# Patient Record
Sex: MALE | Race: WHITE | HISPANIC OR LATINO | Employment: UNEMPLOYED | URBAN - METROPOLITAN AREA
[De-identification: names, ages, dates, MRNs, and addresses within clinical notes are randomized per-mention and may not be internally consistent; named-entity substitution may affect disease eponyms.]

---

## 2022-09-06 ENCOUNTER — HOSPITAL ENCOUNTER (EMERGENCY)
Facility: HOSPITAL | Age: 37
Discharge: HOME | End: 2022-09-06
Attending: EMERGENCY MEDICINE

## 2022-09-06 VITALS
DIASTOLIC BLOOD PRESSURE: 81 MMHG | RESPIRATION RATE: 16 BRPM | BODY MASS INDEX: 29.16 KG/M2 | HEIGHT: 65 IN | TEMPERATURE: 99 F | WEIGHT: 175 LBS | HEART RATE: 83 BPM | OXYGEN SATURATION: 98 % | SYSTOLIC BLOOD PRESSURE: 113 MMHG

## 2022-09-06 DIAGNOSIS — R00.2 PALPITATIONS: Primary | ICD-10-CM

## 2022-09-06 DIAGNOSIS — R42 VERTIGO: ICD-10-CM

## 2022-09-06 DIAGNOSIS — R07.9 CHEST PAIN: ICD-10-CM

## 2022-09-06 LAB
ALBUMIN SERPL BCP-MCNC: 3.8 G/DL (ref 3.5–5.2)
ALP SERPL-CCNC: 69 U/L (ref 55–135)
ALT SERPL W/O P-5'-P-CCNC: 81 U/L (ref 10–44)
ANION GAP SERPL CALC-SCNC: 9 MMOL/L (ref 8–16)
AST SERPL-CCNC: 37 U/L (ref 10–40)
BASOPHILS # BLD AUTO: 0.06 K/UL (ref 0–0.2)
BASOPHILS NFR BLD: 0.7 % (ref 0–1.9)
BILIRUB SERPL-MCNC: 0.5 MG/DL (ref 0.1–1)
BNP SERPL-MCNC: 17 PG/ML (ref 0–99)
BUN SERPL-MCNC: 18 MG/DL (ref 6–20)
CALCIUM SERPL-MCNC: 9 MG/DL (ref 8.7–10.5)
CHLORIDE SERPL-SCNC: 108 MMOL/L (ref 95–110)
CO2 SERPL-SCNC: 23 MMOL/L (ref 23–29)
CREAT SERPL-MCNC: 0.8 MG/DL (ref 0.5–1.4)
D DIMER PPP IA.FEU-MCNC: <0.19 MG/L FEU
DIFFERENTIAL METHOD: ABNORMAL
EOSINOPHIL # BLD AUTO: 0.2 K/UL (ref 0–0.5)
EOSINOPHIL NFR BLD: 2.1 % (ref 0–8)
ERYTHROCYTE [DISTWIDTH] IN BLOOD BY AUTOMATED COUNT: 12.3 % (ref 11.5–14.5)
EST. GFR  (NO RACE VARIABLE): >60 ML/MIN/1.73 M^2
GLUCOSE SERPL-MCNC: 107 MG/DL (ref 70–110)
HCT VFR BLD AUTO: 40.8 % (ref 40–54)
HGB BLD-MCNC: 13.9 G/DL (ref 14–18)
IMM GRANULOCYTES # BLD AUTO: 0.02 K/UL (ref 0–0.04)
IMM GRANULOCYTES NFR BLD AUTO: 0.2 % (ref 0–0.5)
LYMPHOCYTES # BLD AUTO: 1.6 K/UL (ref 1–4.8)
LYMPHOCYTES NFR BLD: 18 % (ref 18–48)
MCH RBC QN AUTO: 29 PG (ref 27–31)
MCHC RBC AUTO-ENTMCNC: 34.1 G/DL (ref 32–36)
MCV RBC AUTO: 85 FL (ref 82–98)
MONOCYTES # BLD AUTO: 0.4 K/UL (ref 0.3–1)
MONOCYTES NFR BLD: 4.8 % (ref 4–15)
NEUTROPHILS # BLD AUTO: 6.6 K/UL (ref 1.8–7.7)
NEUTROPHILS NFR BLD: 74.2 % (ref 38–73)
NRBC BLD-RTO: 0 /100 WBC
PLATELET # BLD AUTO: 244 K/UL (ref 150–450)
PMV BLD AUTO: 9.8 FL (ref 9.2–12.9)
POTASSIUM SERPL-SCNC: 3.7 MMOL/L (ref 3.5–5.1)
PROT SERPL-MCNC: 6.7 G/DL (ref 6–8.4)
RBC # BLD AUTO: 4.79 M/UL (ref 4.6–6.2)
SODIUM SERPL-SCNC: 140 MMOL/L (ref 136–145)
TROPONIN I SERPL DL<=0.01 NG/ML-MCNC: <0.006 NG/ML (ref 0–0.03)
TROPONIN I SERPL DL<=0.01 NG/ML-MCNC: <0.006 NG/ML (ref 0–0.03)
WBC # BLD AUTO: 8.87 K/UL (ref 3.9–12.7)

## 2022-09-06 PROCEDURE — 93010 EKG 12-LEAD: ICD-10-PCS | Mod: ,,, | Performed by: INTERNAL MEDICINE

## 2022-09-06 PROCEDURE — 93005 ELECTROCARDIOGRAM TRACING: CPT

## 2022-09-06 PROCEDURE — 83880 ASSAY OF NATRIURETIC PEPTIDE: CPT | Performed by: EMERGENCY MEDICINE

## 2022-09-06 PROCEDURE — 85025 COMPLETE CBC W/AUTO DIFF WBC: CPT | Performed by: EMERGENCY MEDICINE

## 2022-09-06 PROCEDURE — 85379 FIBRIN DEGRADATION QUANT: CPT | Performed by: EMERGENCY MEDICINE

## 2022-09-06 PROCEDURE — 99285 EMERGENCY DEPT VISIT HI MDM: CPT | Mod: 25

## 2022-09-06 PROCEDURE — 96360 HYDRATION IV INFUSION INIT: CPT

## 2022-09-06 PROCEDURE — 80053 COMPREHEN METABOLIC PANEL: CPT | Performed by: EMERGENCY MEDICINE

## 2022-09-06 PROCEDURE — 25000003 PHARM REV CODE 250: Performed by: EMERGENCY MEDICINE

## 2022-09-06 PROCEDURE — 84484 ASSAY OF TROPONIN QUANT: CPT | Performed by: EMERGENCY MEDICINE

## 2022-09-06 PROCEDURE — 93010 ELECTROCARDIOGRAM REPORT: CPT | Mod: ,,, | Performed by: INTERNAL MEDICINE

## 2022-09-06 RX ORDER — MECLIZINE HYDROCHLORIDE 25 MG/1
50 TABLET ORAL
Status: COMPLETED | OUTPATIENT
Start: 2022-09-06 | End: 2022-09-06

## 2022-09-06 RX ORDER — MECLIZINE HYDROCHLORIDE 25 MG/1
25 TABLET ORAL 3 TIMES DAILY PRN
Qty: 20 TABLET | Refills: 0 | Status: SHIPPED | OUTPATIENT
Start: 2022-09-06

## 2022-09-06 RX ADMIN — MECLIZINE HYDROCHLORIDE 50 MG: 25 TABLET ORAL at 08:09

## 2022-09-06 RX ADMIN — SODIUM CHLORIDE 1000 ML: 0.9 INJECTION, SOLUTION INTRAVENOUS at 08:09

## 2022-09-06 NOTE — ED TRIAGE NOTES
Pt BIB EMS with C/O palpations and dizziness since 0600. Pt states dizziness denies CP, N/V/D, fever, or H/A. No aggravating or alleviating factors for palpations.   Pt is AAOx4, NAD, VS as charted, breathing even and unlabored. No PMHx.

## 2022-09-06 NOTE — ED PROVIDER NOTES
Encounter Date: 2022    SCRIBE #1 NOTE: I, Estherfelipe Valdez, am scribing for, and in the presence of,  Gera Kelly MD. I have scribed the following portions of the note - Other sections scribed: HPI, ROS, PE, EKG, MDM.     History     Chief Complaint   Patient presents with    Palpitations     EMs called to 36yo male that woke up this morning about 6am feeling like his heart was racing and having mild SOB.        This 37 y.o male, with no medical history, presents to the ED via EMS transportation c/o acute, constant palpitations and dizziness that awoke him from sleep at 6:00 am this morning. Pt reports experiencing a room spinning sensation, but also notes feeling as though he wasn't to pass out. The dizziness is presently persisting. He also notes currently feeling short of breath. Of note, pt repots that he consumed a large amount of beer last night. He notes that he typically consumes alcohol 1x a week and only smokes tobacco at that time as well. No drug use. No recent falls or head trauma. Additionally, pt notes that his father  of cardiac issues in his 60's. He denies headache, sinus issues, abdominal pain, chest pain, or any other associated symptoms. No alleviating factors.    The history is provided by the patient. The history is limited by a language barrier. A  was used ( ID#: [id number]).   Review of patient's allergies indicates:  No Known Allergies  No past medical history on file.  No past surgical history on file.  No family history on file.     Review of Systems   Constitutional:  Negative for fever.   HENT:  Negative for sinus pain and sore throat.    Eyes:  Negative for visual disturbance.   Respiratory:  Positive for shortness of breath.    Cardiovascular:  Positive for palpitations. Negative for chest pain.   Gastrointestinal:  Negative for abdominal pain and nausea.   Genitourinary:  Negative for dysuria.   Musculoskeletal:  Negative for back pain.    Skin:  Negative for rash.   Neurological:  Positive for dizziness. Negative for weakness and headaches.     Physical Exam     Initial Vitals [09/06/22 0711]   BP Pulse Resp Temp SpO2   138/83 (!) 115 18 98.3 °F (36.8 °C) 97 %      MAP       --         Physical Exam    Nursing note and vitals reviewed.  Constitutional: He appears well-developed and well-nourished. He is not diaphoretic. No distress.   HENT:   Head: Normocephalic and atraumatic.   Mouth/Throat: Oropharynx is clear and moist.   Eyes: EOM are normal. Pupils are equal, round, and reactive to light.   Horizontal nystagmus in both directions.   Neck: Neck supple.   Cardiovascular:  Normal rate and regular rhythm.           Pulmonary/Chest: Breath sounds normal. No respiratory distress.   Abdominal: Abdomen is soft. Bowel sounds are normal.   Musculoskeletal:         General: No edema.      Cervical back: Neck supple.     Neurological: He is alert and oriented to person, place, and time.   Positive Barrett's [place] to the right.   Skin: Skin is warm and dry.   Psychiatric: He has a normal mood and affect.       ED Course   Procedures  Labs Reviewed   CBC W/ AUTO DIFFERENTIAL - Abnormal; Notable for the following components:       Result Value    Hemoglobin 13.9 (*)     Gran % 74.2 (*)     All other components within normal limits   COMPREHENSIVE METABOLIC PANEL - Abnormal; Notable for the following components:    ALT 81 (*)     All other components within normal limits   TROPONIN I   B-TYPE NATRIURETIC PEPTIDE   D DIMER, QUANTITATIVE   TROPONIN I     EKG Readings: (Independently Interpreted)   Rhythm: Sinus Tachycardia. Heart Rate: 111.   T wave flattening, inferior and lateral.   ECG Results              EKG 12-lead (Final result)  Result time 09/07/22 14:55:51      Final result by Interface, Lab In [facility] (09/07/22 14:55:51)                   Narrative:    Test Reason : R07.9,    Vent. Rate : 111 BPM     Atrial Rate : 111 BPM     P-R Int : 142 ms           QRS Dur : 076 ms      QT Int : 316 ms       P-R-T Axes : 049 024 015 degrees     QTc Int : 429 ms    Sinus tachycardia  Nonspecific T wave abnormality  Abnormal ECG  No previous ECGs available  Confirmed by Karlos Grissom MD ([id number]) on 9/7/2022 2:55:43 PM    Referred By: YRIS   SELF           Confirmed By:Karlos Grissom MD                                     EKG 12-LEAD (Final result)  Result time 09/15/22 12:08:22      Final result by Unknown User (09/15/22 12:08:22)                                      Imaging Results              X-Ray Chest AP Portable (Final result)  Result time 09/06/22 08:09:32      Final result by Hunter Duran MD (09/06/22 08:09:32)                   Impression:      No convincing evidence of acute cardiopulmonary disease.      Electronically signed by: Hunter Duran  Date:    09/06/2022  Time:    08:09               Narrative:    EXAMINATION:  XR CHEST AP PORTABLE    CLINICAL HISTORY:  Palpitations/SOB;    TECHNIQUE:  Single frontal view of the chest was performed.    COMPARISON:  None    FINDINGS:  Monitoring leads overlie the chest.  Cardiomediastinal contour appears to be within normal limits.  Lungs appear essentially clear.  No definite pneumothorax or large volume pleural effusion.  No acute findings in the visualized abdomen.  Osseous and soft tissue structures appear without definite acute change.                                       Medications   meclizine tablet 50 mg (50 mg Oral Given 9/6/22 0805)   sodium chloride 0.9% bolus 1,000 mL (0 mLs Intravenous Stopped 9/6/22 0921)     Medical Decision Making:   ED Management:  This is an emergent evaluation of a 37 y.o. male who presents with palpitations and dizziness beginning at 6:00 am this morning. The patient was seen and examined. The history and physical exam was obtained. The nursing notes and vital signs were reviewed. Secondary to symptoms and examination findings, I ordered imaging and labs  Will treat with IV  fluids and meclizine.     Intermittent dizziness associated with head movements. Most consistent with BPV.  Palpitations and SOB has not reoccurred form awaking this morning. May have been anxiety related to the onset of the vertigo. VSS. Normal neurologic exam except positive [place]. Will treat with meclizine and refer to ENT/PCP/Neurology. Pt instructed to return if new or worsening features. Including headache, CP.      Scribe Attestation:   Scribe #1: I performed the above scribed service and the documentation accurately describes the services I performed. I attest to the accuracy of the note.               Clinical Impression:   Final diagnoses:  [R07.9] Chest pain  [R00.2] Palpitations (Primary)  [R42] Vertigo        ED Disposition Condition    Discharge Stable          ED Prescriptions       Medication Sig Dispense Start Date End Date Auth. Provider    meclizine (ANTIVERT) 25 mg tablet Take 1 tablet (25 mg total) by mouth 3 (three) times daily as needed for Dizziness. 20 tablet 9/6/2022 -- Gera Kelly MD          Follow-up Information       Follow up With Specialties Details Why Contact [facility] Emergency Dept Emergency Medicine  As needed [address] [facility] [address] [phone]    [facility]  Schedule an appointment as soon as possible for a visit  to establish primary care [address] [place] [address]  [phone]              I, Gera Kelly, personally performed the services described in this documentation. All medical record entries made by the scribe were at my direction and in my presence. I have reviewed the chart and agree that the record reflects my personal performance and is accurate and complete.      Gera Kelly MD  09/26/22 [id number]

## 2022-10-24 ENCOUNTER — HOSPITAL ENCOUNTER (EMERGENCY)
Facility: HOSPITAL | Age: 37
Discharge: HOME | End: 2022-10-24
Attending: EMERGENCY MEDICINE

## 2022-10-24 VITALS
WEIGHT: 195 LBS | RESPIRATION RATE: 16 BRPM | HEIGHT: 65 IN | BODY MASS INDEX: 32.49 KG/M2 | SYSTOLIC BLOOD PRESSURE: 121 MMHG | HEART RATE: 83 BPM | TEMPERATURE: 99 F | OXYGEN SATURATION: 98 % | DIASTOLIC BLOOD PRESSURE: 72 MMHG

## 2022-10-24 DIAGNOSIS — R29.90 STROKE-LIKE EPISODE: ICD-10-CM

## 2022-10-24 DIAGNOSIS — R53.1 WEAKNESS: Primary | ICD-10-CM

## 2022-10-24 LAB
ALBUMIN SERPL BCP-MCNC: 4.5 G/DL (ref 3.5–5.2)
ALP SERPL-CCNC: 82 U/L (ref 55–135)
ALT SERPL W/O P-5'-P-CCNC: 72 U/L (ref 10–44)
ANION GAP SERPL CALC-SCNC: 10 MMOL/L (ref 8–16)
AST SERPL-CCNC: 37 U/L (ref 10–40)
BASOPHILS # BLD AUTO: 0.05 K/UL (ref 0–0.2)
BASOPHILS NFR BLD: 0.7 % (ref 0–1.9)
BILIRUB SERPL-MCNC: 1.1 MG/DL (ref 0.1–1)
BUN SERPL-MCNC: 19 MG/DL (ref 6–20)
CALCIUM SERPL-MCNC: 9.2 MG/DL (ref 8.7–10.5)
CHLORIDE SERPL-SCNC: 107 MMOL/L (ref 95–110)
CHOLEST SERPL-MCNC: 215 MG/DL (ref 120–199)
CHOLEST/HDLC SERPL: 5.7 {RATIO} (ref 2–5)
CO2 SERPL-SCNC: 22 MMOL/L (ref 23–29)
CREAT SERPL-MCNC: 0.9 MG/DL (ref 0.5–1.4)
CTP QC/QA: YES
DIFFERENTIAL METHOD: NORMAL
EOSINOPHIL # BLD AUTO: 0.3 K/UL (ref 0–0.5)
EOSINOPHIL NFR BLD: 3.3 % (ref 0–8)
ERYTHROCYTE [DISTWIDTH] IN BLOOD BY AUTOMATED COUNT: 11.9 % (ref 11.5–14.5)
EST. GFR  (NO RACE VARIABLE): >60 ML/MIN/1.73 M^2
GLUCOSE SERPL-MCNC: 102 MG/DL (ref 70–110)
HCT VFR BLD AUTO: 43.2 % (ref 40–54)
HDLC SERPL-MCNC: 38 MG/DL (ref 40–75)
HDLC SERPL: 17.7 % (ref 20–50)
HGB BLD-MCNC: 14.5 G/DL (ref 14–18)
IMM GRANULOCYTES # BLD AUTO: 0.01 K/UL (ref 0–0.04)
IMM GRANULOCYTES NFR BLD AUTO: 0.1 % (ref 0–0.5)
LDLC SERPL CALC-MCNC: 127.4 MG/DL (ref 63–159)
LYMPHOCYTES # BLD AUTO: 1.9 K/UL (ref 1–4.8)
LYMPHOCYTES NFR BLD: 25.1 % (ref 18–48)
MCH RBC QN AUTO: 28.3 PG (ref 27–31)
MCHC RBC AUTO-ENTMCNC: 33.6 G/DL (ref 32–36)
MCV RBC AUTO: 84 FL (ref 82–98)
MONOCYTES # BLD AUTO: 0.6 K/UL (ref 0.3–1)
MONOCYTES NFR BLD: 7.2 % (ref 4–15)
NEUTROPHILS # BLD AUTO: 4.9 K/UL (ref 1.8–7.7)
NEUTROPHILS NFR BLD: 63.6 % (ref 38–73)
NONHDLC SERPL-MCNC: 177 MG/DL
NRBC BLD-RTO: 0 /100 WBC
PLATELET # BLD AUTO: 275 K/UL (ref 150–450)
PMV BLD AUTO: 10.2 FL (ref 9.2–12.9)
POCT GLUCOSE: 106 MG/DL (ref 70–110)
POTASSIUM SERPL-SCNC: 3.8 MMOL/L (ref 3.5–5.1)
PROT SERPL-MCNC: 7.7 G/DL (ref 6–8.4)
RBC # BLD AUTO: 5.13 M/UL (ref 4.6–6.2)
SARS-COV-2 RDRP RESP QL NAA+PROBE: NEGATIVE
SODIUM SERPL-SCNC: 139 MMOL/L (ref 136–145)
TRIGL SERPL-MCNC: 248 MG/DL (ref 30–150)
TROPONIN I SERPL DL<=0.01 NG/ML-MCNC: <0.006 NG/ML (ref 0–0.03)
TSH SERPL DL<=0.005 MIU/L-ACNC: 1.28 UIU/ML (ref 0.4–4)
WBC # BLD AUTO: 7.66 K/UL (ref 3.9–12.7)

## 2022-10-24 PROCEDURE — 84484 ASSAY OF TROPONIN QUANT: CPT | Performed by: EMERGENCY MEDICINE

## 2022-10-24 PROCEDURE — 99285 EMERGENCY DEPT VISIT HI MDM: CPT | Mod: 25

## 2022-10-24 PROCEDURE — 87635 SARS-COV-2 COVID-19 AMP PRB: CPT | Performed by: EMERGENCY MEDICINE

## 2022-10-24 PROCEDURE — 93005 ELECTROCARDIOGRAM TRACING: CPT

## 2022-10-24 PROCEDURE — 82962 GLUCOSE BLOOD TEST: CPT

## 2022-10-24 PROCEDURE — 80053 COMPREHEN METABOLIC PANEL: CPT | Performed by: EMERGENCY MEDICINE

## 2022-10-24 PROCEDURE — 25000003 PHARM REV CODE 250: Performed by: EMERGENCY MEDICINE

## 2022-10-24 PROCEDURE — 85025 COMPLETE CBC W/AUTO DIFF WBC: CPT | Performed by: EMERGENCY MEDICINE

## 2022-10-24 PROCEDURE — 93010 EKG 12-LEAD: ICD-10-PCS | Mod: ,,, | Performed by: INTERNAL MEDICINE

## 2022-10-24 PROCEDURE — 96360 HYDRATION IV INFUSION INIT: CPT

## 2022-10-24 PROCEDURE — 80061 LIPID PANEL: CPT | Performed by: EMERGENCY MEDICINE

## 2022-10-24 PROCEDURE — 84443 ASSAY THYROID STIM HORMONE: CPT | Performed by: EMERGENCY MEDICINE

## 2022-10-24 PROCEDURE — 93010 ELECTROCARDIOGRAM REPORT: CPT | Mod: ,,, | Performed by: INTERNAL MEDICINE

## 2022-10-24 PROCEDURE — 84484 ASSAY OF TROPONIN QUANT: CPT

## 2022-10-24 RX ORDER — ASPIRIN 325 MG
325 TABLET ORAL
Status: DISCONTINUED | OUTPATIENT
Start: 2022-10-24 | End: 2022-10-24 | Stop reason: HOSPADM

## 2022-10-24 RX ORDER — NAPROXEN SODIUM 220 MG/1
81 TABLET, FILM COATED ORAL DAILY
Qty: 360 TABLET | Refills: 0 | Status: SHIPPED | OUTPATIENT
Start: 2022-10-24 | End: 2023-10-24

## 2022-10-24 RX ADMIN — SODIUM CHLORIDE 1000 ML: 0.9 INJECTION, SOLUTION INTRAVENOUS at 07:10

## 2022-10-24 NOTE — ED TRIAGE NOTES
Pt states that he was driving home when he had to pull to the side due to weakness, and slurred speech that began about 1500 today. Pt states that he was here recently for palpitations but did not follow up with a PCP. Also states that symptoms were more severe last month when he was here. Pt also reports blurred vision at this time but denies headaches, SOB, CP, fever,N/V/D.

## 2022-10-24 NOTE — ED PROVIDER NOTES
SCRIBE #1 NOTE: I, Bonita Aragon, am scribing for, and in the presence of,  Irina Quinones MD. I have scribed the following portions of the note - Other sections scribed: HPI, ROS, PE.         EM PHYSICIAN NOTE       This patient presents with a complaint of   Chief Complaint   Patient presents with    Dizziness     The patient states that around 1500, while driving, he started having generalized weakness, difficulty finding words, lightheadedness/dizziness, chills and bilateral hand numbness. Denies headache, vision changes, nausea, vomiting, diarrhea, fevers. Patient states that he had these same symptoms about a month ago, but they were worse in severity. Denies medical hx or taking daily medications.    Chills    Weakness    Aphasia       HPI: A 37 y.o. male with no pertinent PMHx, presents to the ED for evaluation of generalized weakness that began around 3 PM today. Patient reports that he was driving home from work when his symptoms began and had to pull the car over. He works with sheet rock. He has associated symptoms of heart palpitations that began at 3:30 PM, trouble with his speech that began at 4:30 PM (resolved), dizziness that began at 5 PM (resolved), bilateral hand numbness, and blurred vision. He states that he was on the phone with his boss and he noticed trouble finding his words. It lasted about 20 minutes and slowly disappeared. He had a similar past experience on 9/6/22, but it was worse in severity. He hasn't seen a neurologist for this issue. He has been eating and drinking normally. No other exacerbating or alleviating factors. Patient denies headache, nausea, vomiting, diarrhea, fever, appetite changes, or any other associated symptoms.  No headache or neck pain.  No neck pain.  No family history of early coronary artery disease or early strokes.  No recent trauma.  No unexplained weight loss.  He reports he similar to past but has not been followed up by a neurologist or primary  "care physician.    REVIEW of PMH, SOC History and Family History:  History reviewed. No pertinent past medical history.  Social history noncontributory  Family history noncontributory  Review of patient's allergies indicates:  No Known Allergies        REVIEW of SYSTEMS  Source: Patient  The nurse's notes and triage vital signs were reviewed.  GENERAL/CONSTITUTIONAL: There is no report of fever, chills, fatigue, weakness, or unexplained weight loss.  EYES: SEE HPI.   CARDIOVASCULAR: SEE HPI. There is no report of chest pain   RESPIRATORY: There is no report of cough or SOB  GASTROINTESTINAL: There is no report of nausea, vomiting, diarrhea  MUSCULOSKELETAL: There is no report of joint or muscle pain. No muscle weakness or tenderness.  SKIN AND BREASTS: There is no report of easy bruising, skin redness, skin rash.  HEMATOLOGIC/LYMPHATIC: There is no report of anemia, bleeding or clotting defects. There is no report of anticoagulant use.  NEURO: SEE HPI. No headache.  The remainder of the ROS is negative.        PHYSICAL EXAMINATION    ED Triage Vitals [10/24/22 1741]   Enc Vitals Group      /85      Pulse (!) 113      Resp 16      Temp 99.3 °F (37.4 °C)      Temp src Oral      SpO2 97 %      Weight 195 lb      Height 5' 4.96"      Head Circumference       Peak Flow       Pain Score       Pain Loc       Pain Edu?       Excl. in GC?      Vital signs and Pulse Ox reviewed in clinical context. Abnormalities noted: Initial vital sign abnormalities have resolved  Pt's level of consciousness is alert, and the patient is in mild distress.  Skin: warm, pink and dry.  Capillary refill is less than 2 seconds.  Mucosa: Mildly dry.  Head and Neck:  Neck is supple, no carotid bruits.  No pain with range of motion of the neck.  Cardiac exam: Mildly tachycardic (106 BPM).   Pulmonary exam: unlabored and clear  Abd Exam: soft nontender   Musculoskeletal: no joint tenderness, deformity or swelling   Neurologic: GCS: GCS 15; 5 " over 5 strength, cranial nerves intact, neck supple. Able to hold all extremities against gravity for 10 seconds. No dysmetria. No pronator drift.  No sensory deficits.  Normal gait.  No visual field deficits.       Initial Impression:  Stroke-like symptoms, resolved prior to arrival  Plan:  Labs, imaging, reassess  Irina Quinones MD, 6:51 PM 10/24/2022      Medical decision making:   Nurses notes and Vital Signs reviewed.  Orders Placed This Encounter   Procedures    CT Head Without Contrast    CBC W/ AUTO DIFFERENTIAL    Comprehensive metabolic panel    TSH    LDL - Lipid Panel    Troponin I    Diet NPO    Cardiac Monitoring - Adult    Vital signs    Neuro checks:  LOC, Orientation, GCS    Orthostatic blood pressure Orthostatic blood pressure and pulse    Pulse Oximetry Continuous    POCT COVID-19 Rapid Screening    ECG 12 lead    Insert peripheral IV       ED Course as of 10/24/22 2115   Mon Oct 24, 2022   2042 CT head is normal []   2042 COVID negative []   2043 Troponin normal []   2043 CBC and CMP are normal []   2043 My independent interpretation of the EKG is normal sinus rhythm at a rate of 100.  There is normal axis and normal intervals.  No ST segment elevation concerning for acute occlusive infarct []   [address] , discussed with patient the finding of his workup.  I have answered all his questions.  He will start taking an aspirin a day and follow-up with Neurology. []      ED Course User Index  [] Irina Quinones MD       MDM  Number of Diagnoses or Management Options  Stroke: new, needed workup     Amount and/or Complexity of Data Reviewed  Clinical lab tests: ordered and reviewed  Tests in the radiology section of CPT®: ordered and reviewed    Risk of Complications, Morbidity, and/or Mortality  Presenting problems: moderate  Diagnostic procedures: moderate  Management options: moderate    Critical Care  Total time providing critical care: < 30 minutes    Patient  Progress  Patient progress: stable       Diagnoses that have been ruled out:   None   Diagnoses that are still under consideration:   None   Final diagnoses:   Weakness   Stroke-like episode            Follow-up Information       Follow up With Specialties Details Why Contact Info    [facility] - Neurology Neurology Schedule an appointment as soon as possible for a visit in 3 days Call to schedule an appointment [address]  [facility] [address]  [phone]          ED Prescriptions       Medication Sig Dispense Start Date End Date Auth. Provider    aspirin 81 MG Chew Take 1 tablet (81 mg total) by mouth once daily. 360 tablet 10/24/2022 10/24/2023 Irina Quinones MD            This note was created using Dictation Software.  This program may occasionally misinterpret certain words and phrases.      SCRIBE ATTESTATION NOTE:   I attest that I personally performed the services documented by the scribe and acknowledged and confirm the content of the note.   Nurses notes were reviewed.  Irina Quinones      Nurses notes were reviewed.      CRITICAL CARE TIME:  These services included the following: chart data review, reviewing nursing notes and researching old charts from internal and external sources, documentation time, consultant collaboration regarding findings and treatment options, medication orders and management, direct patient care, vital sign assessments, physical exam reassessments, and ordering, interpreting and reviewing diagnostic studies/lab tests.    Aggregate critical care time was approximately 20 minutes, which includes only time during which I was engaged in work directly related to the patient's care, as described above, whether at the bedside or elsewhere in the Emergency Department.  It did not include time spent performing other reported procedures or the services of residents, students, nurses or physician assistants.        Transfer of Care:         Scribe Attestation:   Suzette  #1: I performed the above scribed service and the documentation accurately describes the services I performed. I attest to the accuracy of the note.      ED Disposition Condition    Discharge Stable                           Micelle BRIA Quinones MD  10/24/22 2115

## 2022-10-25 NOTE — DISCHARGE INSTRUCTIONS
Vaya al Departamento de Emergencias si experimenta un empeoramiento de los síntomas, no mejora o si tiene preguntas, inquietudes o síntomas nuevos o preocupantes, violetta dolor en el pecho, dificultad para respirar, fiebre, vómitos, infección, debilidad, entumecimiento, dolor de ly, caída facial , o dificultad para hablar. De lo contrario, seguimiento con garcía médico de atención primaria.    Instrucciones de Descarga y Guía de Recursos     Seguimiento  Por favor regrese a la urgencia de [facility] lo más pronto posible si favian síntomas estén empeorando, tiene dolor en el pecho, tiene dificultades de respirar, pierde consciencia, tiene fiebre, tiene confusión, le falta sensación en los brazos o piernas, se siente débil, no puede caminar.    El servicio de interpretación bharat a bharat está disponible en [facility] directamente a través del Departamento de Servicios Lingüísticos al [phone], de lunes a viernes, de 7:00 a. m. a 5:00 p. m.      Recursos para Hispanohablantes en [facility]:Otros recursos que puede encontrar útiles en la comunidad incluyen:    Clínica de [facility] provides patients with a doctor primario. Puede llamar para hacer gala adia o hacer la adia cuando llegues a la clínica.  [address], [place] [address], [facility] [[email or web address]]  [phone]     [facility] centro de servicios sociales y médicos por los hispanohablantes   [address] [place] [address], [facility] [[email or web address]]  [phone]     Clínicas  Niñas de la [place]  Varios Sitios  [phone]    [facility] De Luci Familiar  Varios Sitios  [phone]    [facility]  [address] [place] [address]  [phone]    [facility] de Luci Comunitaria  Varios Sitios  [phone]    [facility] de Luci  [address] [place], [place] [id number] [phone]    [facility] (Cent[facility] de [place] para Personas sin Hogar)  [address],  [place] [address]  [phone]    Clínicas Dentales Económicas  [facility] Center[facility] kuhnAlexandrea IRINA, [phone]: extracciones a precio fiExcelsior Springs Medical Center [facility].: [address], [phone]    Paga de escala proporcional con opciones de Medicaid (LACHIP), seguro privado, y paga propia  Ofrece diagnostica, preventiva, restaurativo, periodontal, cirugía oral  [facility] ([facility] de [place] para Personas sin Hogar): [address], [phone]    Paga de escala proporcional   Ofrece exámenes, antonio-equis, limpia, extracciones, implantes, empastes  [facility] de Luci  [facility].org  [address], + 4 sitios mas  [phone]    Hijas de la [place]  Varios Sitios: [place] [phone], [place] [phone], [facility] [phone], ext [phone]    Recursos Escolar  [facility]  Programa de Preparación Universitaria  Programas de Azucena y Cultura  [phone]    Fundación de Patrimonio Hispánico de [facility]   Becas para estudiantes de secundaria  [phone]    Caridades Católicas Arquidiócesis de [facility] - educación para niños hasta 5 años  [phone]    N[address]  Apoyo parental para escuelas mejores  [phone]    Recursos de Apoyo Comunitario  Congreso de Jomayraleros  [phone]    Clases para inglés violetta segunda idioma  Caridades Católicas Arquidi[place]cesis de [place]  [phone]    [facility]  [phone]    Recursos para la Comida  Supplemental Nutrition Assistance Program (SNAP) - [place] de Comida  [phone]    Women Infants & Children Nutritional Program (WIC) - Programa Nutricional para Mujeres, Infantes y Niños (edad 1-5)  [phone], [phone], [phone]    [facility] Comunit[facility] [place]  Para mujeres, niños - ofrece comida, albergue, otras necesidades  urgentes  [phone]    Refugios para Mujeres Maltratadas, Llame para instrucciones de ingresa  [place]  [phone]    [facility]  [phone] o [phone] (abierto 24 horas, toda la semana)  [place] de Mujeres de [place]  [phone], [phone]     8 de la mañana - 5 de la tardeemiliano-reese  [facility] [place]  [phone]    Recursos de Inmigración  Caridades Católicas Arquidi[place]cesis de [place]  Recursos para inmigración y asilado  Clases de ciudadanía  [phone]    Immigration Services and Legal Advocacy (BEAU): Servicios Inmigrantes y Apoyo Legal  [phone]    Projecto [place]  Trabajo inmigrante para los niños  [address] [facility] [place] [address]    Recursos Financieros  [employer] de [employer]   Banca para Inmigrantes e Hispanohablantes  [phone]    Recursos de Adicción  [facility]: desintoxicación, residencia a corto plazo, residencia de larga duración, clínica comunitaria, hospitalizaciones, ambulatorios  Clientes potenciales tienen que llamar ellos mismos y hacer gala entrevista en persona o por teléfono  [phone]; [phone]    [facility] - [place] (solo hombres más que 18 años): Residencias de larga duración para tratamiento de adicción, programa intensiva  [phone]     [facility] - [place] de [place] (solo mujeres más que 18 años): Residencias de larga duración para tratamiento de adicción, programa intensiva  [phone]     Organizaciones con varios recursos para ofrecer:  Centro de Recursos Hispano - [place] [place]  [phone]    [facility] de Justicia Familiar [place] [facility]  Servicios para aquellos afectados para violencia doméstica, abuso sexual, abuso infantil, tráfico de personas  [phone] (24 horas, toda la semana)            And in Vietnamese:     Visitas en línea:  Visite [email or web address] ([email or web address]) para programar gala adia  ahora.      ¿Está pensando en programar gala visita en línea? Aquí hay 12 cosas que necesita saber sobre [facility].    ¡Puede consultar el perfil del médico o proveedor de práctica avanzada para determinar si hablan español!  Puede tamara a un proveedor bharat a bharat sin tener que salir de casa o del trabajo.  Amarilys conveniente servicio está disponible las 24 horas del día, los 7 días de la semana, los 365 días del año, llueva o truene.  Los tiempos de espera para las citas de [facility] son menos de 10 minutos en promedio.  Puede recibir tratamiento para gala serie de afecciones comunes y dolencias menores. ¡EN AMARILYS MOMENTO, LAS PRUEBAS y EL TRATAMIENTO DE COVID NO ESTÁN DISPONIBLES a través de [facility]!  ¡Es fácil de usar! Simplemente descargue la aplicación gratuita [facility] o visite [email or web address].  Si nunca antes ha usado Skype o FaceTime, nuestro equipo puede ayudarlo a guiarlo a través del proceso.  Los proveedores de [facility] son médicos autorizados y aplicaciones en las que puede confiar la atención médica de elaine seres queridos.  El servicio es privado y seguro.  [facility] edwardo $10* por visita y debe pagarse por adelantado con gala tarjeta de crédito o débito. No hay tarifas mensuales o anuales ocultas. Es posible que pueda enviar el costo a garcía compañía de seguros para garcía reembolso.  Las visitas de [facility] no deben reemplazar los controles regulares con garcía médico de atención primaria.  Elaine proveedores habituales de [facility] pueden acceder a elaine registros de [facility], lo que permite gala atención de seguimiento de mainor calidad en zully de que la necesite.    Para aprovechar [facility], debe tener 18 años o más o estar acompañado por un padre o .    *El precio de la atención de urgencia disminuyó temporalmente a $10 por visita angela los eventos de COVID-19 y está sujeto a cambios.